# Patient Record
Sex: MALE | Race: WHITE | URBAN - METROPOLITAN AREA
[De-identification: names, ages, dates, MRNs, and addresses within clinical notes are randomized per-mention and may not be internally consistent; named-entity substitution may affect disease eponyms.]

---

## 2017-05-19 ENCOUNTER — TELEPHONE (OUTPATIENT)
Dept: INTERNAL MEDICINE | Facility: CLINIC | Age: 37
End: 2017-05-19

## 2017-05-19 NOTE — TELEPHONE ENCOUNTER
Reading of b/p given to patient over the phone from his visit on 12/14/2016.  Susanna Gomez, CMA

## 2017-05-19 NOTE — TELEPHONE ENCOUNTER
Patient would like copy of blood pressure readings please call patient asap with this phone [phone]

## 2018-01-05 ENCOUNTER — OFFICE VISIT (OUTPATIENT)
Dept: INTERNAL MEDICINE | Facility: CLINIC | Age: 38
End: 2018-01-05
Payer: COMMERCIAL

## 2018-01-05 VITALS
OXYGEN SATURATION: 99 % | HEIGHT: 74 IN | TEMPERATURE: 97.9 F | BODY MASS INDEX: 39.49 KG/M2 | DIASTOLIC BLOOD PRESSURE: 90 MMHG | WEIGHT: 307.7 LBS | HEART RATE: 98 BPM | SYSTOLIC BLOOD PRESSURE: 152 MMHG

## 2018-01-05 DIAGNOSIS — R10.11 RUQ ABDOMINAL PAIN: Primary | ICD-10-CM

## 2018-01-05 LAB
BASOPHILS # BLD AUTO: 0 10E9/L (ref 0–0.2)
BASOPHILS NFR BLD AUTO: 0.5 %
DIFFERENTIAL METHOD BLD: NORMAL
EOSINOPHIL # BLD AUTO: 0.2 10E9/L (ref 0–0.7)
EOSINOPHIL NFR BLD AUTO: 1.9 %
ERYTHROCYTE [DISTWIDTH] IN BLOOD BY AUTOMATED COUNT: 13 % (ref 10–15)
HCT VFR BLD AUTO: 45.4 % (ref 40–53)
HGB BLD-MCNC: 14.9 G/DL (ref 13.3–17.7)
LIPASE SERPL-CCNC: 192 U/L (ref 73–393)
LYMPHOCYTES # BLD AUTO: 1.6 10E9/L (ref 0.8–5.3)
LYMPHOCYTES NFR BLD AUTO: 19.7 %
MCH RBC QN AUTO: 29 PG (ref 26.5–33)
MCHC RBC AUTO-ENTMCNC: 32.8 G/DL (ref 31.5–36.5)
MCV RBC AUTO: 88 FL (ref 78–100)
MONOCYTES # BLD AUTO: 0.8 10E9/L (ref 0–1.3)
MONOCYTES NFR BLD AUTO: 10.3 %
NEUTROPHILS # BLD AUTO: 5.5 10E9/L (ref 1.6–8.3)
NEUTROPHILS NFR BLD AUTO: 67.6 %
PLATELET # BLD AUTO: 210 10E9/L (ref 150–450)
RBC # BLD AUTO: 5.14 10E12/L (ref 4.4–5.9)
WBC # BLD AUTO: 8.1 10E9/L (ref 4–11)

## 2018-01-05 PROCEDURE — 36415 COLL VENOUS BLD VENIPUNCTURE: CPT | Performed by: PHYSICIAN ASSISTANT

## 2018-01-05 PROCEDURE — 83690 ASSAY OF LIPASE: CPT | Performed by: PHYSICIAN ASSISTANT

## 2018-01-05 PROCEDURE — 80053 COMPREHEN METABOLIC PANEL: CPT | Performed by: PHYSICIAN ASSISTANT

## 2018-01-05 PROCEDURE — 99213 OFFICE O/P EST LOW 20 MIN: CPT | Performed by: PHYSICIAN ASSISTANT

## 2018-01-05 PROCEDURE — 85025 COMPLETE CBC W/AUTO DIFF WBC: CPT | Performed by: PHYSICIAN ASSISTANT

## 2018-01-05 RX ORDER — AMLODIPINE AND BENAZEPRIL HYDROCHLORIDE 5; 20 MG/1; MG/1
1 CAPSULE ORAL DAILY
Refills: 3 | COMMUNITY
Start: 2017-11-14

## 2018-01-05 NOTE — PROGRESS NOTES
"  SUBJECTIVE:   Nathanael Chang is a 37 year old male who presents to clinic today for the following health issues:      ABDOMINAL   PAIN     Onset: x1.5-2 months     Description:   Character: Dull ache  Location: right upper quadrant  Radiation: over to the L side at times,     Intensity: 3-4/10    Progression of Symptoms:  Slowly getting worse    Accompanying Signs & Symptoms:  Fever/Chills?: no   Gas/Bloating: YES  Nausea: no   Vomitting: no   Diarrhea?: no   Constipation:YES  Dysuria or Hematuria: no   History:   Trauma: no   Previous similar pain: YES   Previous tests done: none    Precipitating factors:   Does the pain change with:     Food: YES-    etoh last night and had an issue     BM: YES-   Gets better    Urination: No    Alleviating factors:  Na     Therapies Tried and outcome: tums-with relief a couple times     LMP:  not applicable     Tums to help- constipation though.   Pain better after bowel movement     -------------------------------------    Problem list and histories reviewed & adjusted, as indicated.  Additional history: as documented    Labs reviewed in EPIC    Reviewed and updated as needed this visit by clinical staffTobacco  Allergies  Soc Hx      Reviewed and updated as needed this visit by Provider  Allergies  Meds         ROS:  Constitutional, HEENT, cardiovascular, pulmonary, gi and gu systems are negative, except as otherwise noted.      OBJECTIVE:   /90 (BP Location: Left arm, Patient Position: Chair, Cuff Size: Adult Large)  Pulse 98  Temp 97.9  F (36.6  C) (Oral)  Ht 6' 2\" (1.88 m)  Wt (!) 307 lb 11.2 oz (139.6 kg)  SpO2 99%  BMI 39.51 kg/m2  Body mass index is 39.51 kg/(m^2).  GENERAL: healthy, alert and no distress  NECK: no adenopathy, no asymmetry, masses, or scars and thyroid normal to palpation  RESP: lungs clear to auscultation - no rales, rhonchi or wheezes  CV: regular rate and rhythm, normal S1 S2, no S3 or S4, no murmur, click or rub, no " peripheral edema and peripheral pulses strong  ABDOMEN: tenderness epigastric and RUQ and bowel sounds normal  MS: no gross musculoskeletal defects noted, no edema  SKIN: no suspicious lesions or rashes    Diagnostic Test Results:  Pending     ASSESSMENT/PLAN:             1. RUQ abdominal pain    - CBC with platelets differential  - Comprehensive metabolic panel  - Lipase  - US Abdomen Limited; Future    Reviewed establish care with new PCP  After testing done to review results.      Susanna Lovelace PA-C  [facility]

## 2018-01-05 NOTE — MR AVS SNAPSHOT
After Visit Summary   1/5/2018    Nathanael Chang    MRN: [id number]           Patient Information     Date Of Birth          1980        Visit Information        Provider Department      1/5/2018 3:00 PM Susanna Lovelace PA-C [facility]        Today's Diagnoses     RUQ abdominal pain    -  1       Follow-ups after your visit        Your next 10 appointments already scheduled     Jan 16, 2018 10:30 AM CST   US ABDOMEN LIMITED with OXUS1   [facility] ([facility])    [address] [place] [id number] [phone]           Please bring a list of your medicines (including vitamins, minerals and over-the-counter drugs). Also, tell your doctor about any allergies you may have. Wear comfortable clothes and leave your valuables at home.  Adults: No eating or drinking for 8 hours before the exam. You may take medicine with a small sip of water.  Children: - Children 6+ years: No food or drink for 6 hours before exam. - Children 1-5 years: No food or drink for 4 hours before exam. - Infants, breast-fed: may have breast milk up to 2 hours before exam. - Infants, formula: may have bottle until 4 hours before exam.  Please call the Imaging Department at your exam site with any questions.              Future tests that were ordered for you today     Open Future Orders        Priority Expected Expires Ordered    US Abdomen Limited Routine  1/5/2019 1/5/2018            Who to contact     If you have questions or need follow up information about today's clinic visit or your schedule please contact [facility] directly at [phone].  Normal or non-critical lab and imaging results will be communicated to you by MyChart, letter or phone within 4 business days after the clinic has received the results. If you do not hear from us within 7 days, please contact the clinic through [employer]  "or phone. If you have a critical or abnormal lab result, we will notify you by phone as soon as possible.  Submit refill requests through [employer] or call your pharmacy and they will forward the refill request to us. Please allow 3 business days for your refill to be completed.          Additional Information About Your Visit        The Black Tuxhart Information     AppShare lets you send messages to your doctor, view your test results, renew your prescriptions, schedule appointments and more. To sign up, go to [email or web address] . Click on \"Log in\" on the left side of the screen, which will take you to the Welcome page. Then click on \"Sign up Now\" on the right side of the page.     You will be asked to enter the access code listed below, as well as some personal information. Please follow the directions to create your username and password.     Your access code is: [email or web address]  Expires: 2018  3:40 PM     Your access code will  in 90 days. If you need help or a new code, please call your [place] clinic or [phone].        Care EveryWhere ID     This is your Care EveryWhere ID. This could be used by other organizations to access your [place] medical records  [id number]        Your Vitals Were     Pulse Temperature Height Pulse Oximetry BMI (Body Mass Index)       98 97.9  F (36.6  C) (Oral) 6' 2\" (1.88 m) 99% 39.51 kg/m2        Blood Pressure from Last 3 Encounters:   18 152/90   16 140/90   06/15/15 125/79    Weight from Last 3 Encounters:   18 (!) 307 lb 11.2 oz (139.6 kg)   16 272 lb 8 oz (123.6 kg)   06/15/15 288 lb (130.6 kg)              We Performed the Following     CBC with platelets differential     Comprehensive metabolic panel     Lipase          Today's Medication Changes          These changes are accurate as of: 18  3:40 PM.  If you have any questions, ask your nurse or doctor.               Stop taking these medicines if you haven't already. Please contact your " care team if you have questions.     cefdinir 300 MG capsule   Commonly known as:  OMNICEF   Stopped by:  Susanna Lovelace PA-C           fluticasone 50 MCG/ACT spray   Commonly known as:  FLONASE   Stopped by:  Susanna Lovelace PA-C           MUCINEX D PO   Stopped by:  Susanna Lovelace PA-C           Pseudoephedrine-Ibuprofen  MG Tabs   Stopped by:  Susanna Lovelace PA-C                    Primary Care Provider    None Specified       No primary provider on file.        Equal Access to Services     [facility]: Hadii aad ku hadasho Soomaali, waaxda luqadaha, qaybta kaalmada adeegyada, waxay lashaunin haydeepakn kayley rosales . So [facility] [phone].    ATENCIÓN: Si habla español, tiene a granados disposición servicios gratuitos de asistencia lingüística. Llame al [phone].    We comply with applicable federal civil rights laws and [place] laws. We do not discriminate on the basis of race, color, national origin, age, disability, sex, sexual orientation, or gender identity.            Thank you!     Thank you for choosing [facility]  for your care. Our goal is always to provide you with excellent care. Hearing back from our patients is one way we can continue to improve our services. Please take a few minutes to complete the written survey that you may receive in the mail after your visit with us. Thank you!             Your Updated Medication List - Protect others around you: Learn how to safely use, store and throw away your medicines at www.disposemymeds.org.          This list is accurate as of: 1/5/18  3:40 PM.  Always use your most recent med list.                   Brand Name Dispense Instructions for use Diagnosis    amLODIPine-benazepril 5-20 MG per capsule    LOTREL     Take 1 capsule by mouth daily

## 2018-01-06 LAB
ALBUMIN SERPL-MCNC: 4 G/DL (ref 3.4–5)
ALP SERPL-CCNC: 65 U/L (ref 40–150)
ALT SERPL W P-5'-P-CCNC: 91 U/L (ref 0–70)
ANION GAP SERPL CALCULATED.3IONS-SCNC: 6 MMOL/L (ref 3–14)
AST SERPL W P-5'-P-CCNC: 32 U/L (ref 0–45)
BILIRUB SERPL-MCNC: 0.6 MG/DL (ref 0.2–1.3)
BUN SERPL-MCNC: 14 MG/DL (ref 7–30)
CALCIUM SERPL-MCNC: 9 MG/DL (ref 8.5–10.1)
CHLORIDE SERPL-SCNC: 105 MMOL/L (ref 94–109)
CO2 SERPL-SCNC: 31 MMOL/L (ref 20–32)
CREAT SERPL-MCNC: 0.91 MG/DL (ref 0.66–1.25)
GFR SERPL CREATININE-BSD FRML MDRD: >90 ML/MIN/1.7M2
GLUCOSE SERPL-MCNC: 100 MG/DL (ref 70–99)
POTASSIUM SERPL-SCNC: 4.3 MMOL/L (ref 3.4–5.3)
PROT SERPL-MCNC: 7.8 G/DL (ref 6.8–8.8)
SODIUM SERPL-SCNC: 142 MMOL/L (ref 133–144)

## 2018-01-16 ENCOUNTER — RADIANT APPOINTMENT (OUTPATIENT)
Dept: ULTRASOUND IMAGING | Facility: CLINIC | Age: 38
End: 2018-01-16
Attending: PHYSICIAN ASSISTANT
Payer: COMMERCIAL

## 2018-01-16 DIAGNOSIS — R10.11 RUQ ABDOMINAL PAIN: ICD-10-CM

## 2018-01-16 PROCEDURE — 76705 ECHO EXAM OF ABDOMEN: CPT

## 2018-01-18 ENCOUNTER — OFFICE VISIT (OUTPATIENT)
Dept: INTERNAL MEDICINE | Facility: CLINIC | Age: 38
End: 2018-01-18
Payer: COMMERCIAL

## 2018-01-18 VITALS
TEMPERATURE: 98.1 F | BODY MASS INDEX: 39.2 KG/M2 | OXYGEN SATURATION: 97 % | DIASTOLIC BLOOD PRESSURE: 92 MMHG | HEART RATE: 96 BPM | WEIGHT: 305.3 LBS | SYSTOLIC BLOOD PRESSURE: 134 MMHG

## 2018-01-18 DIAGNOSIS — R10.11 RUQ ABDOMINAL PAIN: ICD-10-CM

## 2018-01-18 DIAGNOSIS — R10.13 DYSPEPSIA: Primary | ICD-10-CM

## 2018-01-18 DIAGNOSIS — I10 ESSENTIAL HYPERTENSION: ICD-10-CM

## 2018-01-18 DIAGNOSIS — K76.0 FATTY LIVER: ICD-10-CM

## 2018-01-18 PROCEDURE — 99214 OFFICE O/P EST MOD 30 MIN: CPT | Performed by: INTERNAL MEDICINE

## 2018-01-18 NOTE — LETTER
[facility]  [address] [address]  [phone]        February 5, 2019    Nathanael Chang  [address] [address] [address]              Dear Nathanael Chang    This is to remind you that your non-fasting labs is due.    You may call our office at [phone] to schedule an appointment.    Please disregard this notice if you have already had your labs drawn or made an appointment.        Sincerely,        Cisco Romeo MD

## 2018-01-18 NOTE — MR AVS SNAPSHOT
"              After Visit Summary   1/18/2018    Nathanael Chang    MRN: [id number]           Patient Information     Date Of Birth          1980        Visit Information        Provider Department      1/18/2018 6:00 PM Cisco Romeo MD [facility]        Today's Diagnoses     Dyspepsia    -  1    RUQ abdominal pain        Essential hypertension        Fatty liver           Follow-ups after your visit        Future tests that were ordered for you today     Open Future Orders        Priority Expected Expires Ordered    Hepatic panel (Albumin, ALT, AST, Bili, Alk Phos, TP) Routine 4/18/2018 1/18/2019 1/18/2018            Who to contact     If you have questions or need follow up information about today's clinic visit or your schedule please contact [facility] directly at [phone].  Normal or non-critical lab and imaging results will be communicated to you by [employer] App[employer]art, letter or phone within 4 business days after the clinic has received the results. If you do not hear from us within 7 days, please contact the clinic through MyChart or phone. If you have a critical or abnormal lab result, we will notify you by phone as soon as possible.  Submit refill requests through [employer] or call your pharmacy and they will forward the refill request to us. Please allow 3 business days for your refill to be completed.          Additional Information About Your Visit        MyChart Information     [employer] lets you send messages to your doctor, view your test results, renew your prescriptions, schedule appointments and more. To sign up, go to [email or web address] . Click on \"Log in\" on the left side of the screen, which will take you to the Welcome page. Then click on \"Sign up Now\" on the right side of the page.     You will be asked to enter the access code listed below, as well as some personal information. Please follow the directions to create your " username and password.     Your access code is: [email or web address]  Expires: 2018  3:40 PM     Your access code will  in 90 days. If you need help or a new code, please call your [place] clinic or [phone].        Care EveryWhere ID     This is your Care EveryWhere ID. This could be used by other organizations to access your [place] medical records  [id number]        Your Vitals Were     Pulse Temperature Pulse Oximetry BMI (Body Mass Index)          96 98.1  F (36.7  C) (Oral) 97% 39.2 kg/m2         Blood Pressure from Last 3 Encounters:   18 (!) 134/92   18 152/90   16 140/90    Weight from Last 3 Encounters:   18 (!) 305 lb 4.8 oz (138.5 kg)   18 (!) 307 lb 11.2 oz (139.6 kg)   16 272 lb 8 oz (123.6 kg)                 Today's Medication Changes          These changes are accurate as of: 18  6:35 PM.  If you have any questions, ask your nurse or doctor.               Start taking these medicines.        Dose/Directions    ranitidine 150 MG tablet   Commonly known as:  ZANTAC   Used for:  Dyspepsia   Started by:  Cisco Romeo MD        Dose:  150 mg   Take 1 tablet (150 mg) by mouth 2 times daily   Quantity:  30 tablet   Refills:  0            Where to get your medicines      Some of these will need a paper prescription and others can be bought over the counter.  Ask your nurse if you have questions.     You don't need a prescription for these medications     ranitidine 150 MG tablet                Primary Care Provider Fax #    Physician No Ref-Primary [phone]       No address on file        Equal Access to Services     [facility]: Trish Enriquez, waana lukofi, qaybta kaalmada corrine, livan pascual. So [facility] [phone].    ATENCIÓN: Si habla español, tiene a granados disposición servicios gratuitos de asistencia lingüística. Llame al [phone].    We comply with applicable federal civil rights laws  and [place] laws. We do not discriminate on the basis of race, color, national origin, age, disability, sex, sexual orientation, or gender identity.            Thank you!     Thank you for choosing [facility]  for your care. Our goal is always to provide you with excellent care. Hearing back from our patients is one way we can continue to improve our services. Please take a few minutes to complete the written survey that you may receive in the mail after your visit with us. Thank you!             Your Updated Medication List - Protect others around you: Learn how to safely use, store and throw away your medicines at www.disposemymeds.org.          This list is accurate as of: 1/18/18  6:35 PM.  Always use your most recent med list.                   Brand Name Dispense Instructions for use Diagnosis    amLODIPine-benazepril 5-20 MG per capsule    LOTREL     Take 1 capsule by mouth daily        NICORETTE 2 MG gum   Generic drug:  nicotine polacrilex      Place 2 mg inside cheek as needed for smoking cessation        ranitidine 150 MG tablet    ZANTAC    30 tablet    Take 1 tablet (150 mg) by mouth 2 times daily    Dyspepsia

## 2018-04-03 ENCOUNTER — OFFICE VISIT (OUTPATIENT)
Dept: URGENT CARE | Facility: URGENT CARE | Age: 38
End: 2018-04-03
Payer: COMMERCIAL

## 2018-04-03 VITALS
TEMPERATURE: 100.5 F | HEART RATE: 135 BPM | SYSTOLIC BLOOD PRESSURE: 135 MMHG | BODY MASS INDEX: 39.8 KG/M2 | OXYGEN SATURATION: 98 % | WEIGHT: 310 LBS | DIASTOLIC BLOOD PRESSURE: 80 MMHG

## 2018-04-03 DIAGNOSIS — I10 ESSENTIAL HYPERTENSION: ICD-10-CM

## 2018-04-03 DIAGNOSIS — J02.0 STREP THROAT: Primary | ICD-10-CM

## 2018-04-03 DIAGNOSIS — R68.89 FLU-LIKE SYMPTOMS: ICD-10-CM

## 2018-04-03 DIAGNOSIS — R07.0 THROAT PAIN: ICD-10-CM

## 2018-04-03 LAB
DEPRECATED S PYO AG THROAT QL EIA: ABNORMAL
FLUAV+FLUBV AG SPEC QL: NEGATIVE
FLUAV+FLUBV AG SPEC QL: NEGATIVE
SPECIMEN SOURCE: ABNORMAL
SPECIMEN SOURCE: NORMAL

## 2018-04-03 PROCEDURE — 87880 STREP A ASSAY W/OPTIC: CPT | Performed by: PHYSICIAN ASSISTANT

## 2018-04-03 PROCEDURE — 87804 INFLUENZA ASSAY W/OPTIC: CPT | Performed by: PHYSICIAN ASSISTANT

## 2018-04-03 PROCEDURE — 99214 OFFICE O/P EST MOD 30 MIN: CPT | Performed by: PHYSICIAN ASSISTANT

## 2018-04-03 RX ORDER — AMOXICILLIN 875 MG
875 TABLET ORAL 2 TIMES DAILY
Qty: 20 TABLET | Refills: 0 | Status: SHIPPED | OUTPATIENT
Start: 2018-04-03

## 2018-04-03 NOTE — NURSING NOTE
"Chief Complaint   Patient presents with     Urgent Care     Pt c/o sore throat and exposed to strep       Initial /82  Pulse 135  Temp 100.5  F (38.1  C) (Oral)  Wt 310 lb (140.6 kg)  SpO2 98%  BMI 39.8 kg/m2 Estimated body mass index is 39.8 kg/(m^2) as calculated from the following:    Height as of 1/5/18: 6' 2\" (1.88 m).    Weight as of this encounter: 310 lb (140.6 kg).  Medication Reconciliation: unable or not appropriate to perform    Singh Holliday CMA  "

## 2018-04-03 NOTE — PATIENT INSTRUCTIONS
(J02.0) Strep throat  (primary encounter diagnosis)  Comment:   Plan: amoxicillin (AMOXIL) 875 MG tablet        Considered contagious for 24 hours  Toss toothbrush    (R68.89) Flu-like symptoms  Comment:   Plan: Influenza A/B antigen            (R07.0) Throat pain  Comment:   Plan: Strep, Rapid Screen            (I10) Essential hypertension  Comment:   Plan: keep a log of BP readings, follow up with Primary Clinic for re-check in 1 month

## 2018-04-03 NOTE — PROGRESS NOTES
SUBJECTIVE:   Nathanael Chang is a 37 year old male presenting with a chief complaint of sore throat.  Onset of symptoms was 1 day(s) ago.  Course of illness is worsening, now with fever since this morning   Severity moderate  Current and Associated symptoms: as above  Treatment measures tried include None tried.  Predisposing factors include strep exposure.    SH: still working as an     No past medical history on file.  Patient Active Problem List   Diagnosis     Essential hypertension     Fatty liver     Social History   Substance Use Topics     Smoking status: Former Smoker     Types: Cigarettes     Smokeless tobacco: Never Used     Alcohol use Yes      Comment: 4-6     FH: HTN    ROS:  CONSTITUTIONAL:as per HPI  INTEGUMENTARY/SKIN: NEGATIVE for worrisome rashes, moles or lesions  EYES: NEGATIVE for vision changes or irritation  ENT/MOUTH: as per HPI  RESP:NEGATIVE for significant cough or SOB  CV: NEGATIVE for chest pain, palpitations or peripheral edema  GI: NEGATIVE for nausea, abdominal pain, heartburn, or change in bowel habits  MUSCULOSKELETAL: NEGATIVE for significant arthralgias or myalgia  NEURO: NEGATIVE for weakness, dizziness or paresthesias    OBJECTIVE  :/82  Pulse 135  Temp 100.5  F (38.1  C) (Oral)  Wt 310 lb (140.6 kg)  SpO2 98%  BMI 39.8 kg/m2  GENERAL APPEARANCE: healthy, alert and no distress  EYES: EOMI,  PERRL, conjunctiva clear  HENT: ear canals and TM's normal.  Nose and mouth without ulcers,  lesions  HENT: OP is erythematous  NECK: supple, nontender, no lymphadenopathy  RESP: lungs clear to auscultation - no rales, rhonchi or wheezes  CV: regular rates and rhythm, normal S1 S2, no murmur noted  ABDOMEN:  soft, nontender, no HSM or masses and bowel sounds normal  NEURO: Normal strength and tone, sensory exam grossly normal,  normal speech and mentation  SKIN: no suspicious lesions or rashes    (J02.0) Strep throat  (primary encounter  diagnosis)  Comment:   Plan: amoxicillin (AMOXIL) 875 MG tablet        Considered contagious for 24 hours  Toss toothbrush    (R68.89) Flu-like symptoms  Comment:   Plan: Influenza A/B antigen            (R07.0) Throat pain  Comment:   Plan: Strep, Rapid Screen            (I10) Essential hypertension  Comment: persisting since last visit 1/18/18  Plan: keep a log of BP readings, follow up with Primary Clinic for re-check in 1 month      Patient expresses understanding and agreement with the assessment and plan as above.

## 2018-04-03 NOTE — MR AVS SNAPSHOT
"              After Visit Summary   4/3/2018    Nathanael Chang    MRN: [id number]           Patient Information     Date Of Birth          1980        Visit Information        Provider Department      4/3/2018 4:10 PM Charis Rico PA-C [facility]        Today's Diagnoses     Strep throat    -  1    Flu-like symptoms        Throat pain        Essential hypertension          Care Instructions    (J02.0) Strep throat  (primary encounter diagnosis)  Comment:   Plan: amoxicillin (AMOXIL) 875 MG tablet        Considered contagious for 24 hours  Toss toothbrush    (R68.89) Flu-like symptoms  Comment:   Plan: Influenza A/B antigen            (R07.0) Throat pain  Comment:   Plan: Strep, Rapid Screen            (I10) Essential hypertension  Comment:   Plan: keep a log of BP readings, follow up with Primary Clinic for re-check in 1 month              Follow-ups after your visit        Who to contact     If you have questions or need follow up information about today's clinic visit or your schedule please contact [facility] directly at [phone].  Normal or non-critical lab and imaging results will be communicated to you by Skyscraperhart, letter or phone within 4 business days after the clinic has received the results. If you do not hear from us within 7 days, please contact the clinic through [employer] or phone. If you have a critical or abnormal lab result, we will notify you by phone as soon as possible.  Submit refill requests through [employer] or call your pharmacy and they will forward the refill request to us. Please allow 3 business days for your refill to be completed.          Additional Information About Your Visit        Skys[employer]perhart Information     [employer] lets you send messages to your doctor, view your test results, renew your prescriptions, schedule appointments and more. To sign up, go to [email or web address] . Click on \"Log in\" on " "the left side of the screen, which will take you to the Welcome page. Then click on \"Sign up Now\" on the right side of the page.     You will be asked to enter the access code listed below, as well as some personal information. Please follow the directions to create your username and password.     Your access code is: [email or web address]  Expires: 2018  4:40 PM     Your access code will  in 90 days. If you need help or a new code, please call your [place] clinic or [phone].        Care EveryWhere ID     This is your Care EveryWhere ID. This could be used by other organizations to access your [place] medical records  [id number]        Your Vitals Were     Pulse Temperature Pulse Oximetry BMI (Body Mass Index)          135 100.5  F (38.1  C) (Oral) 98% 39.8 kg/m2         Blood Pressure from Last 3 Encounters:   18 118/82   18 (!) 134/92   18 152/90    Weight from Last 3 Encounters:   18 310 lb (140.6 kg)   18 (!) 305 lb 4.8 oz (138.5 kg)   18 (!) 307 lb 11.2 oz (139.6 kg)              We Performed the Following     Influenza A/B antigen     Nursing Communication 1     Strep, Rapid Screen          Today's Medication Changes          These changes are accurate as of 4/3/18  5:04 PM.  If you have any questions, ask your nurse or doctor.               Start taking these medicines.        Dose/Directions    amoxicillin 875 MG tablet   Commonly known as:  AMOXIL   Used for:  Strep throat   Started by:  Charis Rico PA-C        Dose:  875 mg   Take 1 tablet (875 mg) by mouth 2 times daily   Quantity:  20 tablet   Refills:  0            Where to get your medicines      These medications were sent to [facility] [address] [address]     Phone:  [phone]     amoxicillin 875 MG tablet                Primary Care Provider Fax #    Physician No Ref-Primary [phone]       No address on file   "      Equal Access to Services     [facility]: Hadii aad ku hadalfredokurtis Kieranali, wanoyda luqadaha, qaybta kabenjaminlivan philip. So [facility] [phone].    ATENCIÓN: Si habla español, tiene a granados disposición servicios gratuitos de asistencia lingüística. Bentleyame al [phone].    We comply with applicable federal civil rights laws and [place] laws. We do not discriminate on the basis of race, color, national origin, age, disability, sex, sexual orientation, or gender identity.            Thank you!     Thank you for choosing [facility]  for your care. Our goal is always to provide you with excellent care. Hearing back from our patients is one way we can continue to improve our services. Please take a few minutes to complete the written survey that you may receive in the mail after your visit with us. Thank you!             Your Updated Medication List - Protect others around you: Learn how to safely use, store and throw away your medicines at www.disposemymeds.org.          This list is accurate as of 4/3/18  5:04 PM.  Always use your most recent med list.                   Brand Name Dispense Instructions for use Diagnosis    amLODIPine-benazepril 5-20 MG per capsule    LOTREL     Take 1 capsule by mouth daily        amoxicillin 875 MG tablet    AMOXIL    20 tablet    Take 1 tablet (875 mg) by mouth 2 times daily    Strep throat       NICORETTE 2 MG gum   Generic drug:  nicotine polacrilex      Place 2 mg inside cheek as needed for smoking cessation        ranitidine 150 MG tablet    ZANTAC    30 tablet    Take 1 tablet (150 mg) by mouth 2 times daily    Dyspepsia

## 2020-03-02 ENCOUNTER — HEALTH MAINTENANCE LETTER (OUTPATIENT)
Age: 40
End: 2020-03-02

## 2020-12-20 ENCOUNTER — HEALTH MAINTENANCE LETTER (OUTPATIENT)
Age: 40
End: 2020-12-20

## 2021-04-18 ENCOUNTER — HEALTH MAINTENANCE LETTER (OUTPATIENT)
Age: 41
End: 2021-04-18

## 2021-10-03 ENCOUNTER — HEALTH MAINTENANCE LETTER (OUTPATIENT)
Age: 41
End: 2021-10-03

## 2022-05-15 ENCOUNTER — HEALTH MAINTENANCE LETTER (OUTPATIENT)
Age: 42
End: 2022-05-15

## 2022-09-10 ENCOUNTER — HEALTH MAINTENANCE LETTER (OUTPATIENT)
Age: 42
End: 2022-09-10

## 2023-06-03 ENCOUNTER — HEALTH MAINTENANCE LETTER (OUTPATIENT)
Age: 43
End: 2023-06-03

## 2023-10-31 NOTE — PROGRESS NOTES
SUBJECTIVE:   Nathanael Chang is a 37 year old male who presents to clinic today for the following health issues:      Abdominal Pain      Duration: 2 yr ago, intermittent but more consistently X 1-2 months    Description (location/character/radiation): RUQ- dull. Seems more noticeable when sitting.         Associated flank pain: slight pain on R    Intensity:  mild    Accompanying signs and symptoms:        Fever/Chills: no        Gas/Bloating: YES- at times       Nausea/vomitting: no        Diarrhea: YES- and some constipation       Dysuria or Hematuria: no     History (previous similar pain/trauma/previous testing): US and lab results    Precipitating or alleviating factors:       Pain worse with eating/BM/urination: yes but not all the time       Pain relieved by BM: YES    Therapies tried and outcome:  Took some tums . Maybe some relief?     LMP:  not applicable        Hypertension Follow-up  BP Readings from Last 3 Encounters:   01/18/18 (!) 134/92   01/05/18 152/90   12/14/16 140/90        Outpatient blood pressures are not being checked.    Low Salt Diet: no added salt          Problem list and histories reviewed & adjusted, as indicated.  Additional history: as documented    Labs reviewed in EPIC    Reviewed and updated as needed this visit by clinical staffTobacco  Allergies       Reviewed and updated as needed this visit by Provider         ROS:  Constitutional, HEENT, cardiovascular, pulmonary, gi and gu systems are negative, except as otherwise noted.      OBJECTIVE:                                                    BP (!) 134/92  Pulse 96  Temp 98.1  F (36.7  C) (Oral)  Wt (!) 305 lb 4.8 oz (138.5 kg)  SpO2 97%  BMI 39.2 kg/m2  Body mass index is 39.2 kg/(m^2).  GENERAL APPEARANCE: alert, no distress and over weight  HENT: nose and mouth without ulcers or lesions  NECK: no adenopathy, no asymmetry, masses, or scars and thyroid normal to palpation  RESP: lungs clear to auscultation -  no rales, rhonchi or wheezes  CV: regular rates and rhythm, normal S1 S2, no S3 or S4 and no murmur, click or rub  ABDOMEN: soft, nontender, without hepatosplenomegaly or masses and bowel sounds normal  MS: extremities normal- no gross deformities noted    Diagnostic test results:  Results for orders placed or performed in visit on 01/16/18   US Abdomen Limited    Narrative    ULTRASOUND ABDOMEN LIMITED   1/16/2018 11:43 AM     HISTORY: Right upper quadrant pain.    COMPARISON: None.    FINDINGS: There is diffuse fatty infiltration of the liver. No focal  hepatic lesions are seen. The gallbladder is unremarkable, without  evidence for stones or sludge. No intra or extrahepatic bile duct  dilatation. Common hepatic duct is normal in diameter. The entire  common duct could not be visualized on this exam. Limited evaluation  of the right kidney is unremarkable. Pancreas is partially obscured by  overlying bowel gas, but appears normal where seen. The abdominal  aorta is of normal caliber where visualized. The IVC is not  visualized. The exam is somewhat limited related to patient body  habitus.      Impression    IMPRESSION:   1. Diffuse fatty infiltration of the liver.  2. Nonvisualization of the IVC and portions of the pancreas.     SAMEER SHI MD          ASSESSMENT/PLAN:                                                    1. Dyspepsia  Initial eval quite negative (labs/u/s)  rec'd empiric trial of anti-acid regimen   - ranitidine (ZANTAC) 150 MG tablet; Take 1 tablet (150 mg) by mouth 2 times daily  Dispense: 30 tablet; Refill: 0  - Hepatic panel (Albumin, ALT, AST, Bili, Alk Phos, TP); Future    2. RUQ abdominal pain  Very minimal ALT elev- likely fatty liver. Repeat in a fw months   - Hepatic panel (Albumin, ALT, AST, Bili, Alk Phos, TP); Future    3. Essential hypertension  Doesn't seem under good control  Weight loss  F/u here. Consider change/dose increase in regimen given #    4. Fatty liver  Weight  loss      Cisco Romeo MD  [facility][1]   show